# Patient Record
Sex: FEMALE | Race: WHITE | URBAN - METROPOLITAN AREA
[De-identification: names, ages, dates, MRNs, and addresses within clinical notes are randomized per-mention and may not be internally consistent; named-entity substitution may affect disease eponyms.]

---

## 2017-01-09 LAB
BLD GP AB SCN SERPL QL: NEGATIVE
HBV SURFACE AG SERPL QL IA: NONREACTIVE
HIV 1+2 AB+HIV1 P24 AG SERPL QL IA: NEGATIVE
RUBELLA ANTIBODY IGG QUANTITATIVE: NORMAL IU/ML
T PALLIDUM IGG SER QL: NONREACTIVE

## 2017-08-09 LAB
GROUP B STREP PCR: NEGATIVE
GROUP B STREP PCR: NEGATIVE

## 2017-09-06 ENCOUNTER — HOSPITAL ENCOUNTER (INPATIENT)
Facility: CLINIC | Age: 32
LOS: 3 days | Discharge: HOME HEALTH | End: 2017-09-09
Attending: ADVANCED PRACTICE MIDWIFE | Admitting: ADVANCED PRACTICE MIDWIFE
Payer: COMMERCIAL

## 2017-09-06 ENCOUNTER — ANESTHESIA (OUTPATIENT)
Dept: OBGYN | Facility: CLINIC | Age: 32
End: 2017-09-06
Payer: COMMERCIAL

## 2017-09-06 ENCOUNTER — ANESTHESIA EVENT (OUTPATIENT)
Dept: OBGYN | Facility: CLINIC | Age: 32
End: 2017-09-06
Payer: COMMERCIAL

## 2017-09-06 PROBLEM — Z34.00 PRIMIGRAVIDA, ANTEPARTUM: Status: ACTIVE | Noted: 2017-09-06

## 2017-09-06 LAB
ABO + RH BLD: NORMAL
ABO + RH BLD: NORMAL
SPECIMEN EXP DATE BLD: NORMAL

## 2017-09-06 PROCEDURE — 86901 BLOOD TYPING SEROLOGIC RH(D): CPT | Performed by: ADVANCED PRACTICE MIDWIFE

## 2017-09-06 PROCEDURE — 12000029 ZZH R&B OB INTERMEDIATE

## 2017-09-06 PROCEDURE — 25000128 H RX IP 250 OP 636: Performed by: ANESTHESIOLOGY

## 2017-09-06 PROCEDURE — 86900 BLOOD TYPING SEROLOGIC ABO: CPT | Performed by: ADVANCED PRACTICE MIDWIFE

## 2017-09-06 PROCEDURE — 25000128 H RX IP 250 OP 636: Performed by: ADVANCED PRACTICE MIDWIFE

## 2017-09-06 PROCEDURE — 37000011 ZZH ANESTHESIA WARD SERVICE

## 2017-09-06 PROCEDURE — 25000125 ZZHC RX 250: Performed by: ADVANCED PRACTICE MIDWIFE

## 2017-09-06 PROCEDURE — 25000125 ZZHC RX 250: Performed by: ANESTHESIOLOGY

## 2017-09-06 PROCEDURE — 36415 COLL VENOUS BLD VENIPUNCTURE: CPT | Performed by: ADVANCED PRACTICE MIDWIFE

## 2017-09-06 PROCEDURE — 86780 TREPONEMA PALLIDUM: CPT | Performed by: ADVANCED PRACTICE MIDWIFE

## 2017-09-06 PROCEDURE — 25000132 ZZH RX MED GY IP 250 OP 250 PS 637: Performed by: ADVANCED PRACTICE MIDWIFE

## 2017-09-06 PROCEDURE — 00HU33Z INSERTION OF INFUSION DEVICE INTO SPINAL CANAL, PERCUTANEOUS APPROACH: ICD-10-PCS | Performed by: ANESTHESIOLOGY

## 2017-09-06 PROCEDURE — 3E0R3CZ INTRODUCTION OF REGIONAL ANESTHETIC INTO SPINAL CANAL, PERCUTANEOUS APPROACH: ICD-10-PCS | Performed by: ANESTHESIOLOGY

## 2017-09-06 RX ORDER — ONDANSETRON 2 MG/ML
4 INJECTION INTRAMUSCULAR; INTRAVENOUS EVERY 6 HOURS PRN
Status: DISCONTINUED | OUTPATIENT
Start: 2017-09-06 | End: 2017-09-07

## 2017-09-06 RX ORDER — ACETAMINOPHEN 325 MG/1
650 TABLET ORAL EVERY 4 HOURS PRN
Status: DISCONTINUED | OUTPATIENT
Start: 2017-09-06 | End: 2017-09-07

## 2017-09-06 RX ORDER — EPHEDRINE SULFATE 50 MG/ML
5 INJECTION, SOLUTION INTRAMUSCULAR; INTRAVENOUS; SUBCUTANEOUS
Status: DISCONTINUED | OUTPATIENT
Start: 2017-09-06 | End: 2017-09-07

## 2017-09-06 RX ORDER — OXYTOCIN/0.9 % SODIUM CHLORIDE 30/500 ML
100-340 PLASTIC BAG, INJECTION (ML) INTRAVENOUS CONTINUOUS PRN
Status: COMPLETED | OUTPATIENT
Start: 2017-09-06 | End: 2017-09-07

## 2017-09-06 RX ORDER — ONDANSETRON 4 MG/1
4 TABLET, ORALLY DISINTEGRATING ORAL EVERY 6 HOURS PRN
Status: DISCONTINUED | OUTPATIENT
Start: 2017-09-06 | End: 2017-09-07

## 2017-09-06 RX ORDER — FENTANYL CITRATE 50 UG/ML
100 INJECTION, SOLUTION INTRAMUSCULAR; INTRAVENOUS ONCE
Status: COMPLETED | OUTPATIENT
Start: 2017-09-06 | End: 2017-09-06

## 2017-09-06 RX ORDER — SODIUM CHLORIDE, SODIUM LACTATE, POTASSIUM CHLORIDE, CALCIUM CHLORIDE 600; 310; 30; 20 MG/100ML; MG/100ML; MG/100ML; MG/100ML
INJECTION, SOLUTION INTRAVENOUS CONTINUOUS
Status: DISCONTINUED | OUTPATIENT
Start: 2017-09-06 | End: 2017-09-07

## 2017-09-06 RX ORDER — OXYCODONE AND ACETAMINOPHEN 5; 325 MG/1; MG/1
1 TABLET ORAL
Status: DISCONTINUED | OUTPATIENT
Start: 2017-09-06 | End: 2017-09-06

## 2017-09-06 RX ORDER — NALOXONE HYDROCHLORIDE 0.4 MG/ML
.1-.4 INJECTION, SOLUTION INTRAMUSCULAR; INTRAVENOUS; SUBCUTANEOUS
Status: DISCONTINUED | OUTPATIENT
Start: 2017-09-06 | End: 2017-09-07

## 2017-09-06 RX ORDER — IBUPROFEN 400 MG/1
800 TABLET, FILM COATED ORAL
Status: DISCONTINUED | OUTPATIENT
Start: 2017-09-06 | End: 2017-09-07

## 2017-09-06 RX ORDER — METHYLERGONOVINE MALEATE 0.2 MG/ML
200 INJECTION INTRAVENOUS
Status: DISCONTINUED | OUTPATIENT
Start: 2017-09-06 | End: 2017-09-07

## 2017-09-06 RX ORDER — CALCIUM CARBONATE 500 MG/1
500-1000 TABLET, CHEWABLE ORAL
Status: DISCONTINUED | OUTPATIENT
Start: 2017-09-06 | End: 2017-09-09 | Stop reason: HOSPADM

## 2017-09-06 RX ORDER — PRENATAL VIT/IRON FUM/FOLIC AC 27MG-0.8MG
1 TABLET ORAL DAILY
COMMUNITY

## 2017-09-06 RX ORDER — OXYTOCIN/0.9 % SODIUM CHLORIDE 30/500 ML
1-24 PLASTIC BAG, INJECTION (ML) INTRAVENOUS CONTINUOUS
Status: DISCONTINUED | OUTPATIENT
Start: 2017-09-06 | End: 2017-09-07

## 2017-09-06 RX ORDER — TERBUTALINE SULFATE 1 MG/ML
0.25 INJECTION, SOLUTION SUBCUTANEOUS
Status: DISCONTINUED | OUTPATIENT
Start: 2017-09-06 | End: 2017-09-07

## 2017-09-06 RX ORDER — OXYTOCIN 10 [USP'U]/ML
10 INJECTION, SOLUTION INTRAMUSCULAR; INTRAVENOUS
Status: DISCONTINUED | OUTPATIENT
Start: 2017-09-06 | End: 2017-09-07

## 2017-09-06 RX ORDER — CARBOPROST TROMETHAMINE 250 UG/ML
250 INJECTION, SOLUTION INTRAMUSCULAR
Status: DISCONTINUED | OUTPATIENT
Start: 2017-09-06 | End: 2017-09-07

## 2017-09-06 RX ORDER — NALBUPHINE HYDROCHLORIDE 10 MG/ML
2.5-5 INJECTION, SOLUTION INTRAMUSCULAR; INTRAVENOUS; SUBCUTANEOUS EVERY 6 HOURS PRN
Status: DISCONTINUED | OUTPATIENT
Start: 2017-09-06 | End: 2017-09-07

## 2017-09-06 RX ORDER — LIDOCAINE 40 MG/G
CREAM TOPICAL
Status: DISCONTINUED | OUTPATIENT
Start: 2017-09-06 | End: 2017-09-07

## 2017-09-06 RX ORDER — ROPIVACAINE HYDROCHLORIDE 2 MG/ML
10 INJECTION, SOLUTION EPIDURAL; INFILTRATION; PERINEURAL ONCE
Status: COMPLETED | OUTPATIENT
Start: 2017-09-06 | End: 2017-09-06

## 2017-09-06 RX ADMIN — SODIUM CHLORIDE, POTASSIUM CHLORIDE, SODIUM LACTATE AND CALCIUM CHLORIDE 500 ML: 600; 310; 30; 20 INJECTION, SOLUTION INTRAVENOUS at 13:10

## 2017-09-06 RX ADMIN — SODIUM CHLORIDE, POTASSIUM CHLORIDE, SODIUM LACTATE AND CALCIUM CHLORIDE: 600; 310; 30; 20 INJECTION, SOLUTION INTRAVENOUS at 20:24

## 2017-09-06 RX ADMIN — Medication 5 MG: at 17:46

## 2017-09-06 RX ADMIN — SODIUM CHLORIDE, POTASSIUM CHLORIDE, SODIUM LACTATE AND CALCIUM CHLORIDE 150 ML: 600; 310; 30; 20 INJECTION, SOLUTION INTRAVENOUS at 17:46

## 2017-09-06 RX ADMIN — SODIUM CHLORIDE, POTASSIUM CHLORIDE, SODIUM LACTATE AND CALCIUM CHLORIDE 500 ML: 600; 310; 30; 20 INJECTION, SOLUTION INTRAVENOUS at 16:22

## 2017-09-06 RX ADMIN — Medication 12 ML/HR: at 17:37

## 2017-09-06 RX ADMIN — FENTANYL CITRATE 100 MCG: 50 INJECTION, SOLUTION INTRAMUSCULAR; INTRAVENOUS at 17:16

## 2017-09-06 RX ADMIN — ACETAMINOPHEN 650 MG: 325 TABLET, FILM COATED ORAL at 20:48

## 2017-09-06 RX ADMIN — SODIUM CHLORIDE, POTASSIUM CHLORIDE, SODIUM LACTATE AND CALCIUM CHLORIDE: 600; 310; 30; 20 INJECTION, SOLUTION INTRAVENOUS at 13:45

## 2017-09-06 RX ADMIN — OXYTOCIN-SODIUM CHLORIDE 0.9% IV SOLN 30 UNIT/500ML 2 MILLI-UNITS/MIN: 30-0.9/5 SOLUTION at 14:11

## 2017-09-06 RX ADMIN — ROPIVACAINE HYDROCHLORIDE 10 ML: 2 INJECTION, SOLUTION EPIDURAL; INFILTRATION at 17:16

## 2017-09-06 NOTE — H&P
[facility] Labor and Delivery History and Physical    Renetta Araujo MRN# [id number]   Age: 31 year old YOB: 1985     Date of Admission:  2017    Primary care provider: No primary care provider on file.           Chief Complaint:   Renetta Araujo is a 31 year old female who is Unknown pregnant and being admitted for induction of labor, indication prolonged prodromal labor and AROM. She received prenatal care from [facility] and the prenatal record is scanned into the chart.          Pregnancy history:     OBSTETRIC HISTORY:    Obstetric History       T0      L0     SAB0   TAB0   Ectopic0   Multiple0   Live Births0       # Outcome Date GA Lbr Beka/2nd Weight Sex Delivery Anes PTL Lv   1 Current                   EDC: Estimated Date of Delivery: Data Unavailable    Prenatal Labs:   Lab Results   Component Value Date    ABO A 2017    RH Pos 2017       GBS Status:   Neg  Active Problem List  Patient Active Problem List   Diagnosis     Indication for care in labor or delivery     Prolonged labor antepartum     Primigravida, antepartum       Medication Prior to Admission  Prescriptions Prior to Admission   Medication Sig Dispense Refill Last Dose     RaNITidine HCl (ZANTAC PO) Take 75 mg by mouth daily   2017 at Unknown time     Prenatal Vit-Fe Fumarate-FA (PRENATAL MULTIVITAMIN PLUS IRON) 27-0.8 MG TABS per tablet Take 1 tablet by mouth daily   Past Week at Unknown time   .        Maternal Past Medical History:   History knee surgery      Family History:   I have reviewed this patient's family history            Social History:   This patient has no significant social history         Review of Systems:   The Review of Systems is negative other than noted in the HPI          Physical Exam:     Constitutional: Awake, alert, cooperative, no apparent distress, and appears stated age.  Eyes: Lids and lashes normal, pupils equal, round and reactive to light,  extra ocular muscles intact, sclera clear, conjunctiva normal.  ENT: Normocephalic, without obvious abnormality, atramatic, sinuses nontender on palpation, external ears without lesions, oral pharynx with moist mucus membranes, tonsils without erythema or exudates, gums normal and good dentition.  Neck: Supple, symmetrical, trachea midline, no adenopathy, thyroid symmetric, not enlarged and no tenderness, skin normal.  Hematologic / Lymphatic: No cervical lymphadenopathy and no supraclavicular lymphadenopathy.  Back: Symmetric, no curvature, spinous processes are non-tender on palpation, paraspinous muscles are non-tender on palpation, no costal vertebral tenderness.  Lungs: No increased work of breathing, good air exchange, clear to auscultation bilaterally, no crackles or wheezing.  Cardiovascular: Regular rate and rhythm, normal S1 and S2, no S3 or S4, and no murmur noted.  Chest / Breast: Breasts symmetrical, skin without lesion(s), no nipple retraction or dimpling, no nipple discharge, no masses palpated, no axillary or supraclavicular adenopathy.  Abdomen: No scars, normal bowel sounds, soft, non-distended, non-tender, no masses palpated, no hepatosplenomegally.  Musculoskeletal: No redness, warmth, or swelling of the joints.  Full range of motion noted.  Motor strength is 5 out of 5 all extremities bilaterally.  Tone is normal.  Neurologic: Awake, alert, oriented to name, place and time.  Cranial nerves II-XII are grossly intact.  Motor is 5 out of 5 bilaterally.  Cerebellar finger to nose, heel to shin intact.  Sensory is intact.  Babinski down going, Romberg negative, and gait is normal.  Neuropsychiatric: Normal affect, mood, orientation, memory and insight.  Skin: No rashes, erythema, pallor, petechia or purpura.   Cervix:   Membranes: intact   Dilation: 3   Effacement: 100%   Station:-1   Consistency: soft   Position: Mid  Presentation:Cephalic  Fetal Heart Rate Tracing: reactive and  reassuring  Tocometer: external monitor                       Assessment:   Renetta Araujo is a Unknown pregnant female admitted with induction of labor, augmentation and AROM.  Patient is coping poorly  Category 1 FHR pattern with reactive NST on admission.         Plan:   Admit - see IP orders  Labor induction with Pitocin  She is accompanied by  [place], who will be supporting her during her labor and birth.   She does plan to have a  working with her as well.   Her birth plan indicates the following preferences: Vitamin K for baby but no other baby meds.  Desires  screen.  Had planned for unmedicated birth and is disappointed in her long prodromal labor with little progress.  Now desires epidural for comfort and pain management.   She has not elected to have a water birth.  Desires use of nitrous gas and has been consented regarding it's use.  Routine intrapartum cares as ordered with continuous EFM.   Patient encouraged to move positions frequently to promote physiologic labor and birth.   Plan AROM to promote labor, then provide pain relief with nitrous and epidural as needed.      Emmy ANG CNM

## 2017-09-06 NOTE — ANESTHESIA PROCEDURE NOTES
Peripheral nerve/Neuraxial procedure note : epidural catheter  Pre-Procedure  Performed by BISHNU ZHANG  Location: OB      Pre-Anesthestic Checklist: patient identified, risks and benefits discussed, informed consent, pre-op evaluation and at physician/surgeon's request    Timeout  Correct Patient: Yes   Correct Procedure: Yes   Correct Site: Yes   Correct Laterality: N/A   Correct Position: Yes   Site Marked: N/A   .   Procedure Documentation    .    Procedure:    Epidural catheter.  Insertion Site:L3-4  (midline approach) Injection technique: LORT saline   Local skin infiltrated with 3 mL of 1% lidocaine.       Patient Prep;mask, sterile gloves, povidone-iodine 7.5% surgical scrub, patient draped.  .  Needle: Touhy needle Needle Gauge: 17.    Needle Length (Inches) 3.5  # of attempts: 1 and # of redirects:  .   Catheter: 19 G . .  Catheter threaded easily  3 cm epidural space.  .   .    Assessment/Narrative  Paresthesias: No.  .  .  Aspiration negative for heme or CSF  . Test dose of 3 mL lidocaine 1.5% w/ 1:200,000 epinephrine at. Test dose negative for signs of intravascular, subdural or intrathecal injection. Comments:  No blood or complications.  Secured with adhesive spray, tegaderm, and tape.

## 2017-09-06 NOTE — PLAN OF CARE
"1555: pt requesting pain med.  SVE by RN.    1600:  Pt wants to try nitrous.  Pt took one breath of nitrous through FM, said \"don't like it, shut it off!\".  IV bolus not running with pt on hands and knees.  Pt unable to keep hand straight for IV to flow.  IV on pump.  1610:  CNM updated on SVE and epidural request.  "

## 2017-09-06 NOTE — IP AVS SNAPSHOT
MRN:[id number]                      After Visit Summary   9/6/2017    Renetta Araujo    MRN: [id number]           Thank you!     Thank you for choosing [facility] for your care. Our goal is always to provide you with excellent care. Hearing back from our patients is one way we can continue to improve our services. Please take a few minutes to complete the written survey that you may receive in the mail after you visit with us. Thank you!        Patient Information     Date Of Birth          1985        About your hospital stay     You were admitted on:  September 6, 2017 You last received care in the:  [address]    You were discharged on:  September 9, 2017        Reason for your hospital stay       Birth of baby boy                  Who to Call     For medical emergencies, please call 911.  For non-urgent questions about your medical care, please call your primary care provider or clinic, None          Attending Provider     Provider Specialty    Emmy Purcell APRN CN[facility] Midwives    Rubia Hale APRN CNM OB/Gyn       Primary Care Provider    None      After Care Instructions     Diet       Follow this diet upon discharge: your normal healthy diet                  Follow-up Appointments     Follow-up and recommended labs and tests        Follow up with me,  Emmy Purcell, within 8 weeks  . for hospital follow- up.  No follow up labs or test are needed.                  Further instructions from your care team       Postpartum Vaginal Delivery Instructions    Activity       Ask family and friends for help when you need it.    Do not place anything in your vagina for 6 weeks.    You are not restricted on other activities, but take it easy for a few weeks to allow your body to recover from delivery.  You are able to do any activities you feel up to that point.    No driving until you have stopped taking your pain medications (usually two weeks  after delivery).     Call your health care provider if you have any of these symptoms:       Increased pain, swelling, redness, or fluid around your stiches from an episiotomy or perineal tear.    A fever above 100.4 F (38 C) with or without chills when placing a thermometer under your tongue.    You soak a sanitary pad with blood within 1 hour, or you see blood clots larger than a golf ball.    Bleeding that lasts more than 6 weeks.    Vaginal discharge that smells bad.    Severe pain, cramping or tenderness in your lower belly area.    A need to urinate more frequently (use the toilet more often), more urgently (use the toilet very quickly), or it burns when you urinate.    Nausea and vomiting.    Redness, swelling or pain around a vein in your leg.    Problems breastfeeding or a red or painful area on your breast.    Chest pain and cough or are gasping for air.    Problems coping with sadness, anxiety, or depression.  If you have any concerns about hurting yourself or the baby, call your provider immediately.     You have questions or concerns after you return home.     Keep your hands clean:  Always wash your hands before touching your perineal area and stitches.  This helps reduce your risk of infection.  If your hands aren't dirty, you may use an alcohol hand-rub to clean your hands. Keep your nails clean and short.        Pending Results     No orders found from 9/4/2017 to 9/7/2017.            Statement of Approval     Ordered          09/08/17 2027  I have reviewed and agree with all the recommendations and orders detailed in this document.  EFFECTIVE NOW     Comments:  Discharge patient early am on 9/9/17   Approved and electronically signed by:  Emmy Purcell APRN CNM             Admission Information     Date & Time Provider Department Dept. Phone    9/6/2017 Rubia Hale APRN CNM [address] [phone]      Your Vitals Were     Blood Pressure Pulse  "Temperature Respirations Height Weight    118/63 59 98.1  F (36.7  C) (Oral) 16 1.575 m (5' 2\") 85.7 kg (189 lb)    Pulse Oximetry BMI (Body Mass Index)                95% 34.57 kg/m2          [employer] Information     [employer] lets you send messages to your doctor, view your test results, renew your prescriptions, schedule appointments and more. To sign up, go to [email or web address] . Click on \"Log in\" on the left side of the screen, which will take you to the Welcome page. Then click on \"Sign up Now\" on the right side of the page.     You will be asked to enter the access code listed below, as well as some personal information. Please follow the directions to create your username and password.     Your access code is: [email or web address]  Expires: 2017  8:26 PM     Your access code will  in 90 days. If you need help or a new code, please call your [facility] clinic or [phone].        Care EveryWhere ID     This is your Care EveryWhere ID. This could be used by other organizations to access your [facility] medical records  [id number]        Equal Access to Services     ECTOR SALEEM AH: Kemar Trammell, wazenon martinez, qaybta kaalmada adeglenroyyada, jessica tavera. So [facility] [phone].    ATENCIÓN: Si habla español, tiene a sarabia disposición servicios gratuitos de asistencia lingüística. Llame al [phone].    We comply with applicable federal civil rights laws and [place] laws. We do not discriminate on the basis of race, color, national origin, age, disability sex, sexual orientation or gender identity.               Review of your medicines      CONTINUE these medicines which have NOT CHANGED        Dose / Directions    prenatal multivitamin plus iron 27-0.8 MG Tabs per tablet        Dose:  1 tablet   Take 1 tablet by mouth daily   Refills:  0         STOP taking     ZANTAC PO                    Protect others around you: Learn how to safely use, store and throw away " your medicines at www.disposemymeds.org.             Medication List: This is a list of all your medications and when to take them. Check marks below indicate your daily home schedule. Keep this list as a reference.      Medications           Morning Afternoon Evening Bedtime As Needed    prenatal multivitamin plus iron 27-0.8 MG Tabs per tablet   Take 1 tablet by mouth daily

## 2017-09-06 NOTE — IP AVS SNAPSHOT
[address]    [place] [address]    Phone:  [phone]                                       After Visit Summary   9/6/2017    Renetta Araujo    MRN: [id number]           After Visit Summary Signature Page     I have received my discharge instructions, and my questions have been answered. I have discussed any challenges I see with this plan with the nurse or doctor.    ..........................................................................................................................................  Patient/Patient Representative Signature      ..........................................................................................................................................  Patient Representative Print Name and Relationship to Patient    ..................................................               ................................................  Date                                            Time    ..........................................................................................................................................  Reviewed by Signature/Title    ...................................................              ..............................................  Date                                                            Time

## 2017-09-06 NOTE — PLAN OF CARE
Dr. Feliciano at bedside.  Pt assisted into position.  Time out done.  Ropivacaine 0.2% vial and Fentanyl 100 mcg IV handed off to [facility] for epidural use.

## 2017-09-06 NOTE — PLAN OF CARE
Problem: Labor (Cervical Ripen, Induct, Augment) (Adult,Obstetrics,Pediatric)  Goal: Signs and Symptoms of Listed Potential Problems Will be Absent or Manageable (Labor)  Signs and symptoms of listed potential problems will be absent or manageable by discharge/transition of care (reference Labor (Cervical Ripen, Induct, Augment) (Adult,Obstetrics,Pediatric) CPG).   Outcome: No Change  Patient here for augmentation to her prodromal labor; AROM performed by Marquise Booth CNM discussed with patient, patient agrees with plan. Patient desires epidural, will proceed when in active labor. Category I tracing. VSS.

## 2017-09-07 LAB — T PALLIDUM IGG+IGM SER QL: NEGATIVE

## 2017-09-07 PROCEDURE — 25000132 ZZH RX MED GY IP 250 OP 250 PS 637: Performed by: ADVANCED PRACTICE MIDWIFE

## 2017-09-07 PROCEDURE — 12000037 ZZH R&B POSTPARTUM INTERMEDIATE

## 2017-09-07 PROCEDURE — 0KQM0ZZ REPAIR PERINEUM MUSCLE, OPEN APPROACH: ICD-10-PCS | Performed by: ADVANCED PRACTICE MIDWIFE

## 2017-09-07 PROCEDURE — 72200001 ZZH LABOR CARE VAGINAL DELIVERY SINGLE

## 2017-09-07 PROCEDURE — 25000125 ZZHC RX 250: Performed by: ADVANCED PRACTICE MIDWIFE

## 2017-09-07 PROCEDURE — 25000128 H RX IP 250 OP 636: Performed by: ADVANCED PRACTICE MIDWIFE

## 2017-09-07 RX ORDER — OXYTOCIN/0.9 % SODIUM CHLORIDE 30/500 ML
100 PLASTIC BAG, INJECTION (ML) INTRAVENOUS CONTINUOUS
Status: DISCONTINUED | OUTPATIENT
Start: 2017-09-07 | End: 2017-09-08

## 2017-09-07 RX ORDER — MISOPROSTOL 200 UG/1
400 TABLET ORAL
Status: DISCONTINUED | OUTPATIENT
Start: 2017-09-07 | End: 2017-09-09 | Stop reason: HOSPADM

## 2017-09-07 RX ORDER — IBUPROFEN 400 MG/1
400-800 TABLET, FILM COATED ORAL EVERY 6 HOURS PRN
Status: DISCONTINUED | OUTPATIENT
Start: 2017-09-07 | End: 2017-09-09 | Stop reason: HOSPADM

## 2017-09-07 RX ORDER — DEXTROSE, SODIUM CHLORIDE, SODIUM LACTATE, POTASSIUM CHLORIDE, AND CALCIUM CHLORIDE 5; .6; .31; .03; .02 G/100ML; G/100ML; G/100ML; G/100ML; G/100ML
INJECTION, SOLUTION INTRAVENOUS CONTINUOUS
Status: DISCONTINUED | OUTPATIENT
Start: 2017-09-07 | End: 2017-09-09 | Stop reason: HOSPADM

## 2017-09-07 RX ORDER — OXYTOCIN/0.9 % SODIUM CHLORIDE 30/500 ML
340 PLASTIC BAG, INJECTION (ML) INTRAVENOUS CONTINUOUS PRN
Status: DISCONTINUED | OUTPATIENT
Start: 2017-09-07 | End: 2017-09-09 | Stop reason: HOSPADM

## 2017-09-07 RX ORDER — IBUPROFEN 400 MG/1
400-800 TABLET, FILM COATED ORAL EVERY 6 HOURS PRN
Status: DISCONTINUED | OUTPATIENT
Start: 2017-09-07 | End: 2017-09-07

## 2017-09-07 RX ORDER — OXYTOCIN/0.9 % SODIUM CHLORIDE 30/500 ML
100 PLASTIC BAG, INJECTION (ML) INTRAVENOUS CONTINUOUS
Status: DISCONTINUED | OUTPATIENT
Start: 2017-09-07 | End: 2017-09-07

## 2017-09-07 RX ORDER — LANOLIN 100 %
OINTMENT (GRAM) TOPICAL
Status: DISCONTINUED | OUTPATIENT
Start: 2017-09-07 | End: 2017-09-09 | Stop reason: HOSPADM

## 2017-09-07 RX ORDER — MISOPROSTOL 200 UG/1
400 TABLET ORAL
Status: DISCONTINUED | OUTPATIENT
Start: 2017-09-07 | End: 2017-09-07

## 2017-09-07 RX ORDER — HYDROCORTISONE 2.5 %
CREAM (GRAM) TOPICAL 3 TIMES DAILY PRN
Status: DISCONTINUED | OUTPATIENT
Start: 2017-09-07 | End: 2017-09-07

## 2017-09-07 RX ORDER — NALOXONE HYDROCHLORIDE 0.4 MG/ML
.1-.4 INJECTION, SOLUTION INTRAMUSCULAR; INTRAVENOUS; SUBCUTANEOUS
Status: DISCONTINUED | OUTPATIENT
Start: 2017-09-07 | End: 2017-09-07

## 2017-09-07 RX ORDER — BISACODYL 10 MG
10 SUPPOSITORY, RECTAL RECTAL DAILY PRN
Status: DISCONTINUED | OUTPATIENT
Start: 2017-09-09 | End: 2017-09-07

## 2017-09-07 RX ORDER — OXYTOCIN 10 [USP'U]/ML
10 INJECTION, SOLUTION INTRAMUSCULAR; INTRAVENOUS
Status: DISCONTINUED | OUTPATIENT
Start: 2017-09-07 | End: 2017-09-09 | Stop reason: HOSPADM

## 2017-09-07 RX ORDER — AMOXICILLIN 250 MG
1-2 CAPSULE ORAL 2 TIMES DAILY
Status: DISCONTINUED | OUTPATIENT
Start: 2017-09-07 | End: 2017-09-07

## 2017-09-07 RX ORDER — HYDROCORTISONE 2.5 %
CREAM (GRAM) TOPICAL 3 TIMES DAILY PRN
Status: DISCONTINUED | OUTPATIENT
Start: 2017-09-07 | End: 2017-09-09 | Stop reason: HOSPADM

## 2017-09-07 RX ORDER — BISACODYL 10 MG
10 SUPPOSITORY, RECTAL RECTAL DAILY PRN
Status: DISCONTINUED | OUTPATIENT
Start: 2017-09-09 | End: 2017-09-09 | Stop reason: HOSPADM

## 2017-09-07 RX ORDER — OXYTOCIN/0.9 % SODIUM CHLORIDE 30/500 ML
340 PLASTIC BAG, INJECTION (ML) INTRAVENOUS CONTINUOUS PRN
Status: DISCONTINUED | OUTPATIENT
Start: 2017-09-07 | End: 2017-09-07

## 2017-09-07 RX ORDER — AMOXICILLIN 250 MG
1-2 CAPSULE ORAL 2 TIMES DAILY
Status: DISCONTINUED | OUTPATIENT
Start: 2017-09-07 | End: 2017-09-09 | Stop reason: HOSPADM

## 2017-09-07 RX ORDER — NALOXONE HYDROCHLORIDE 0.4 MG/ML
.1-.4 INJECTION, SOLUTION INTRAMUSCULAR; INTRAVENOUS; SUBCUTANEOUS
Status: DISCONTINUED | OUTPATIENT
Start: 2017-09-07 | End: 2017-09-09 | Stop reason: HOSPADM

## 2017-09-07 RX ORDER — LANOLIN 100 %
OINTMENT (GRAM) TOPICAL
Status: DISCONTINUED | OUTPATIENT
Start: 2017-09-07 | End: 2017-09-07

## 2017-09-07 RX ORDER — OXYTOCIN 10 [USP'U]/ML
10 INJECTION, SOLUTION INTRAMUSCULAR; INTRAVENOUS
Status: DISCONTINUED | OUTPATIENT
Start: 2017-09-07 | End: 2017-09-07

## 2017-09-07 RX ORDER — ACETAMINOPHEN 325 MG/1
650 TABLET ORAL EVERY 4 HOURS PRN
Status: DISCONTINUED | OUTPATIENT
Start: 2017-09-07 | End: 2017-09-07

## 2017-09-07 RX ORDER — ACETAMINOPHEN 325 MG/1
650 TABLET ORAL EVERY 4 HOURS PRN
Status: DISCONTINUED | OUTPATIENT
Start: 2017-09-07 | End: 2017-09-09 | Stop reason: HOSPADM

## 2017-09-07 RX ADMIN — RANITIDINE 150 MG: 150 TABLET ORAL at 03:06

## 2017-09-07 RX ADMIN — ACETAMINOPHEN 650 MG: 325 TABLET, FILM COATED ORAL at 08:28

## 2017-09-07 RX ADMIN — RANITIDINE 150 MG: 150 TABLET ORAL at 20:26

## 2017-09-07 RX ADMIN — CALCIUM CARBONATE (ANTACID) CHEW TAB 500 MG 1000 MG: 500 CHEW TAB at 00:19

## 2017-09-07 RX ADMIN — OXYTOCIN-SODIUM CHLORIDE 0.9% IV SOLN 30 UNIT/500ML 2 MILLI-UNITS/MIN: 30-0.9/5 SOLUTION at 01:47

## 2017-09-07 RX ADMIN — SODIUM CHLORIDE, POTASSIUM CHLORIDE, SODIUM LACTATE AND CALCIUM CHLORIDE: 600; 310; 30; 20 INJECTION, SOLUTION INTRAVENOUS at 03:38

## 2017-09-07 RX ADMIN — IBUPROFEN 800 MG: 400 TABLET ORAL at 08:28

## 2017-09-07 RX ADMIN — SENNOSIDES AND DOCUSATE SODIUM 1 TABLET: 8.6; 5 TABLET ORAL at 20:26

## 2017-09-07 RX ADMIN — ACETAMINOPHEN 650 MG: 325 TABLET, FILM COATED ORAL at 14:45

## 2017-09-07 RX ADMIN — ACETAMINOPHEN 650 MG: 325 TABLET, FILM COATED ORAL at 02:28

## 2017-09-07 RX ADMIN — ACETAMINOPHEN 650 MG: 325 TABLET, FILM COATED ORAL at 18:51

## 2017-09-07 RX ADMIN — IBUPROFEN 800 MG: 400 TABLET ORAL at 20:26

## 2017-09-07 RX ADMIN — IBUPROFEN 800 MG: 400 TABLET ORAL at 14:45

## 2017-09-07 RX ADMIN — SODIUM CHLORIDE, SODIUM LACTATE, POTASSIUM CHLORIDE, CALCIUM CHLORIDE AND DEXTROSE MONOHYDRATE: 5; 600; 310; 30; 20 INJECTION, SOLUTION INTRAVENOUS at 00:50

## 2017-09-07 RX ADMIN — OXYTOCIN-SODIUM CHLORIDE 0.9% IV SOLN 30 UNIT/500ML 340 ML/HR: 30-0.9/5 SOLUTION at 06:14

## 2017-09-07 NOTE — CONSULTS
"DATE OF SERVICE:  2017      Renetta Araujo is a 31-year-old  1, now para 1 with intrauterine pregnancy at 40 weeks and 2 days who after several days of prodromal labor was transferred to [facility] by the [place] midwife team.  The patient upon arrival was noted to be 3 cm, 100% effaced.  Clear fluid was noted.  She received an epidural anesthetic and was placed on Pitocin.  At this point in time, the patient did have occasional variable decelerations; however, there was good variability and occasional accelerations.  The patient became completely dilated.  She began pushing, however, with each push the baby had prolonged variable decelerations with a late return.  The patient pushed for 3 times and then stopped as ordered by the midwife present.  I was called to assess the situation for possible vacuum-assisted delivery. Review of the fetal heart tracing indeed revealed severe prolong variable decelerations with late return when she was pushing.   At the time the baby was noted to be at a +1 station, ROT, estimated fetal weight being 7 pounds.  The patient was offered a trial of vacuum versus a  section.  The patient strongly declined  section.  She was very hesitant to proceed with attempt at vacuum delivery.  After further discussion and re review of the fetal tracing it was agreed upon that the patient would push with every other contraction allowing the baby to \"rest\" in between.   She did this for approximately 2 hours, at which time the baby's head was brought down to a +3 station.  The decelerations were now  prolonged and deep lasting several minutes with late return.  At this point in time, the patient agreed to proceed with a vacuum delivery.  The risks were reviewed: bruising to fetal scalp, injury to the fetal cranial bones, hemorrhage and injury to the fetal brain, possible injury to maternal tissue.  After cleansing the area with Betadine solution the " bladder was emptied.  30cc of clear urine was obtained.   The Kiwi vaccum was placed on the occiput of the baby's head.  The baby was noted to be in the ROT position.  With the first pull, the patient's right labia became entrapped under the Kiwi therefore, the Kiwi was removed.  With the next contraction, the Kiwi was placed and with one single pull, the head was delivered to a +5 station.  At this point in time, the Kiwi was removed, and the midwife present stepped in and completed delivery of the fetal head and body.  Please see her dictation.         DAWSON SINGLETON MD             D: 2017 06:29   T: 2017 07:14   MT: ANTOINETTE      Name:     DEVENDRA CARLIN   MRN:      [id number]        Account:       [id number]   :      1985           Consult Date:  2017      Document: [id number]

## 2017-09-07 NOTE — PLAN OF CARE
0930 patient assisted up to bathroom with stand by assist of one , able to void 400cc harrison urine. Pericare instructions given and patient verbalized understanding. Settled back into bed to rest.

## 2017-09-07 NOTE — PROGRESS NOTES
Called to perform assisted vacuum delivery for continued deep prolong variable decelerations. Exam= +3 station /ROT/ EFW=7lb    Risks of procedure discussed with patient and family: bruising of scalp, injury to fetal cranial bones, bleeding in the brain and injury of maternal tissues. Patient accepts the risks.    Bladder emptied= 30cc of clear urine  Kiwi placed on the occiput. First pull was stopped due to labia trapped underneath. The vacuum was removed and readjusted. Fetal head delivered to a +5 station with 2nd pull. Midwife then completed the delivery of the fetal head and body.  See dictation for full report.

## 2017-09-07 NOTE — PLAN OF CARE
Data: Renetta Araujo transferred to 4413 via wheelchair at 1430. Baby transferred via parent's arms.  Action: Receiving unit notified of transfer: Yes. Patient and family notified of room change. Report given to Francheska NOGUEIRA RN at 1430. Belongings sent to receiving unit. Accompanied by Registered Nurse. Oriented patient to surroundings. Call light within reach. ID bands double-checked with receiving RN.  Response: Patient tolerated transfer and is stable.

## 2017-09-07 NOTE — PROGRESS NOTES
S: Coping with labor utilizing the following methods: epidural in place and patient is comfortable  O: Temp:  [97.7  F (36.5  C)-99.3  F (37.4  C)] 99.3  F (37.4  C)  BP: ()/(50-79) 98/56  SpO2:  [92 %-100 %] 96 % Contx: rare; Fetus: FHR variability is moderate with periods of exaggerated variability; Dilation 6/100-/-1  A: Labor progressing, patient and fetus stable, Category 1 tracing.  Periods of exaggerated variability resolved with position changes and O2 admin.  Pitocin was dc'd to resolve heart tone concerns.  P: Anticipate birth, continue current cares to promote physiologic birth.  IUPC placed.  Restart pitocin to adequate labor.  Jean placed.  Peanut ball positioning.  Patient encouraged to rest.

## 2017-09-07 NOTE — ANESTHESIA POSTPROCEDURE EVALUATION
Patient: Renetta Araujo    * No procedures listed *    Diagnosis:* No pre-op diagnosis entered *  Diagnosis Additional Information: No value filed.    Anesthesia Type:  No value filed.    Note:  Anesthesia Post Evaluation    Patient location during evaluation: Bedside  Patient participation: Able to fully participate in evaluation     Anesthetic complications: None    Comments: The patient was satisfied with her labor epidural and had no complaints. She is able to ambulate and denies urinary or bowel complaints.         Last vitals:  Vitals:    09/07/17 1215 09/07/17 1445 09/07/17 1515   BP: 95/50  107/68   Resp:  16 16   Temp: 36.7  C (98.1  F)  36.7  C (98  F)   SpO2: 95%           Electronically Signed By: Daniel Miramontes MD  September 7, 2017  6:20 PM

## 2017-09-07 NOTE — L&D DELIVERY NOTE
OB Vaginal Birth Note    Renetta Araujo MRN# [id number]   Age: 31 year old YOB: 1985       GA: 40w2d  GP:   Labor Complications: Dysfunctional Labor;Fetal Intolerance;Prolonged PROM (>18 hours)   EBL: 350  mL  QBL:    Delivery Type: Vaginal, Vacuum (Extractor)   ROM to Delivery Time: 16h 26m   Weight:      1 Minute 5 Minute 10 Minute   Apgar Totals: 8    9                Delivery Details:  Renetta Araujo, a 31 year old  female delivered a viable infant with apgars of 8   and 9  . Patient was fully dilated and pushing after    hours    minutes in active labor. Delivery was via vaginal, vacuum (extractor)  to a sterile field under epidural;nitrous oxide  anesthesia. Infant delivered in vertex  middle  occiput  anterior  position. Anterior and posterior shoulders delivered without difficulty. The cord was clamped, cut twice and 3 vessels  were noted. Cord blood was obtained in routine fashion with the following disposition: lab .      Cord complications: none   Placenta delivered at   6:18 AM . Placental disposition was   . Fundal massage performed and fundus found to be firm.     Episiotomy: none    Perineum, vagina, cervix were inspected, and the following lacerations were noted:   Perineal lacerations: 1st   periurethral laceration: bilateral                 Laceration repaired by colleague: Rubia Hale CNM    Excellent hemostasis was noted. Needle count correct. Infant and patient in delivery room in good and stable condition.         Labor Event Times    Labor onset date:  17 Onset time:   3:55 PM            Labor Length    3rd Stage (hrs):  0 (min):  7      Labor Events     labor?:  No    steroids:  None   Labor Type:  Augmentation   Predominate monitoring during 1st stage:  continuous electronic fetal monitoring      Antibiotics received during labor?:  No      Rupture date/time: 17 1345   Rupture type:  Artificial Rupture of Membranes   Fluid odor:   Normal      Augmentation:  Oxytocin, AROM   Indications for augmentation:  Ineffective Contraction Pattern   1:1 continuous labor support provided by?:  herve DEE          Delivery/Placenta Date and Time    Delivery Date:  9/7/17 Delivery Time:   6:11 AM   Placenta Date/Time:  9/7/2017  6:18 AM   Oxytocin given at the time of delivery:  after delivery of baby      Vaginal Counts    Initial count performed by 2 team members:   Two Team Members   HAYLEE Newman RN          Needles Suture [place] [employer]ong[employer]   Initial counts 2  5    Added to count  1     Final counts 2 1 5       Placed during labor Accounted for at the end of labor   Yes Yes   Yes Yes   No No      Final count performed by 2 team members:   Two Team Members   HAYLEE Newman RN         Final count correct?:  Yes         Apgars     1 Minute 5 Minute 10 Minute 15 Minute 20 Minute   Skin color: 0  1       Heart rate: 2  2       Reflex irritability: 2  2       Muscle tone: 2  2       Respiratory effort: 2  2       Total: 8  9          Apgars assigned by:  VANESSA MEDINA RN      Cord    Vessels:  3 Vessels Complications:  None   Cord Blood Disposition:  Lab Gases Sent?:  No         Skin to Skin and Feeding Plan    Skin to skin initiation date/time: 9/7/17 0615   Skin to skin with:  Mother   Skin to skin end date/time:     How do you plan to feed your baby:  Breastfeeding      Labor Events and Shoulder Dystocia    Fetal Tracing Prior to Delivery:  Category 2   Fetal Tracing Comments:  variability remained reassuring, but multiple prolonged decels during 2nd stage pushing    Shoulder dystocia present?:  Neg            Delivery (Maternal) (Provider to Complete) (860650)    Episiotomy:  None   Perineal lacerations:  1st    Periurethral laceration:  bilateral    Vaginal laceration?:  No    Cervical laceration?:  No    Est. blood loss (mL):  350   Number of repair packets:  1         Mother's Information  Mother:  GayleRenetta #[id number]    Start of Mother's Information     IO Blood Loss  17 1555 - 17 0634    Mom's I/O Activity            End of Mother's Information  Mother: Renetta Araujo #[id number]            Delivery - Provider to Complete (620613)    Delivering clinician:  EMMY AG   CNDANNA Care:  Any CNM care in labor   Attempted Delivery Types (Choose all that apply):  Vacuum (Extractor)   Delivery Type (Choose the 1 that will go to the Birth History):  Vaginal, Vacuum (Extractor)   Verbal Informed Consent Obtained For Vacuum:  Yes Alternate Labor Strategies Discussed (vacuum):  Yes      Type (vacuum):  Kiwi       Position (vacuum):  OA Fetal Station (vacuum):  +3      Indication for Operative Vaginal Delivery (vacuum):  Fetal Status   Operative Vaginal Delivery Brief Note Vacuum:  Per Dr. Magallanes to :                  Placenta    Delayed Cord Clamping:  Done   Date/Time:  2017  6:18 AM   Removal:  Spontaneous   Comments:  mec stained      Anesthesia    Method:  Epidural, Nitrous Oxide   Cervical dilation at placement:  4-7         Presentation and Position    Presentation:  Vertex   Position:  Middle Occiput Anterior                    Emmy ANG CNM

## 2017-09-07 NOTE — PLAN OF CARE
Problem: Goal Outcome Summary  Goal: Goal Outcome Summary  Outcome: No Change  1445 Pt. admitted from L&D  via W/C and transferred to bed with stand-by assist. Pt. arrived with baby and was accompanied by  and arrived with personal belongings. Report was taken from Ange in L&D. VSS. Fundus is firm and midline.  Vaginal bleeding is scant.  SL in lt hand.  Pt. oriented to the room and call light system.

## 2017-09-07 NOTE — PROCEDURES
Post Vaginal Birth Repair    After birth of infant and delivery of placenta, primary care of patient was resumed. Active management of third stage with IV pitocin infusing. Fundus firm. The vagina and perineum was thoroughly inspected. A second degree perineal laceration, right labial and left labial with extension to vaginal wall lacerations were noted. All repaired with adequate anesthesia via epidural uding 3-0 vicryl in standard fashion for hemostasis.     Sponge and sharp counts correct.     Plan for routine postpartum care.     SAV Seals, CNM, WHKARI-BC

## 2017-09-07 NOTE — LACTATION NOTE
Initial visit.   Breastfeeding handout given.   Advised to breastfeed exclusively, on demand, avoid pacifiers, bottles and formula unless medically indicated.  Encouraged rooming in, skin to skin, feeding on demand 8-12x/day or sooner if baby cues.  Explained benefits of holding and skin to skin.  Encouraged lots of skin to skin.Instructed on hand expression. Patient reports she is working with midwives an has a resource for lactation follow up after discharge. No further questions at this time.    Continues to nurse well per mom.   Will follow as needed.   Jesusita Duggan RNC, IBCLC

## 2017-09-07 NOTE — PLAN OF CARE
Assumed care of this pt at 2330 from Yanet LANDON RN. Emmy HAYLEE Purcell was provider responsible for this pt.     0133: Rubia Hale CNM called to say she would be assuming care of this pt for Emmy. She was on her way over from [place]. RN told CNM pt was complete and laborind down and at this time pt was experiencing variables prolonged at times and pitocin had been turned off. RN will start pitocin again at 0145.    0202: Rubia Hale CNM at bedside to assess FHT and ctx pattern. Pt was still having prolonged variables and lates and the decision was made to increase pitocin to 4m/u. And to start pushing in about 30 min.    0240: Pt was coached how to push by HAYLEE and RN removed hendrix catheter. Pt began pushing. After first 2 contractions, CNM appeared concerned about the dip in FHT down to 60's lasting  2 minutes a piece. Rubia Hale CNM, asked RN who her back-up OB was. RN left the room to gather that information. Jeanette Mcdermott, charge RN found that Dr Magallanes was the midwife back-up OB overnight. RN returned to room 212 with Dr Magallanes's contact information. Upon returning to 212, RN asked CNM if she would like to call Dr Magallanes. CNM asked RN to call Dr Magallanes to come and evaluate this patient for possible vacuum assisted delivery. This RN was not present for conversation between CNM and her pt about why the OB back-up was called.    0248: Upon request of Rubia LEACH, this RN called Dr Magallanes and asked to come as midwife back-up for a possible vacuum assisted delivery. Dr Magallanes agreed but said it would take her 25-30 min to reach [facility] and advised to use the in-house if the need presented itself.    Pushing efforts ceased until  Dr Magallanes arrived.    0311: Dr Magallanes was at bedside. Dr Magallanes evaluated pushing efforts and FHT. Upon observation, Dr Magallanes, talked to patient about her baby's low heart rate and the length that the heart rate was low and why that presents a cause for concern. She then talked to patient about a vacuum  "assisted vaginal delivery and even needing to consider a . Pt was immediately against  and didn't like the the sound of using a vacuum either. Dr Magallanes said to the patient, \" you DO realize why I was called in don't you?\" Pt remained addiment that she did not want a . While Dr Magallanes was in the room the pt continued to showcase deep variables. After position changes were completed and and FHT resumed safe baseline. Dr Magallanes said she would watch strip out at the desk and suggested only pushing with every other contraction.    0345: Rubia Hale CNM, resumed pushing with pt during every other contraction. Pt's FHT's  Continued to display deep variables and prolonged varialbes.    0430: Rubia Hale CNM was called to another delivery on the unit and Emmy Purcell CNM, resumed care of pt at this time. Dr Magallanes was also still present on the unit.    0431:  Unable to regain FHT baseline; pt was positioned on her hands and knees and FHT returned to baseline. After 5 min on hands and knees pt resumed side lying position; pushing with every other ctx.    0528: Pt continues to push every other contraction; FHT tracing displays proglonged decels during pushing and decels with nonpushing contractions.    0532: IV fluid bolus given for low HT''s.    0550: Emmy Purcell CNM, discussed the danger of continuing to push with these low HT's and again asked pt if she would be open to Dr Magallanes evaluating again for a vacuum assisted delivery. Pt agreed.    0600: Dr Magallanes at bedside and evaluated pt pushing infant at +3 and consents pt and her  to a vacuum assist with her next push.    0604: Charge RN called to 2nd for delivery. NNP called to attend delivery    0607: Care Team assembled in room 212 and Dr Magallanes proceeds with kiwi vacuum during ctx at 0609. 30 second pull with no pop-offs and head was delivered. At this time Dr Magallanes stepped aside and Emmy Purcell CNM completed delivery.    0611: Vaginal " delivery of viable male infant. Apgars 8 & 9. Delayed cord clamping and then infant was taken to warmer for a quick evaluation and then placed on mom's chest for skin-to-skin.

## 2017-09-07 NOTE — PLAN OF CARE
Problem: Postpartum, Vaginal Delivery (Adult)  Goal: Signs and Symptoms of Listed Potential Problems Will be Absent or Manageable (Postpartum, Vaginal Delivery)  Signs and symptoms of listed potential problems will be absent or manageable by discharge/transition of care (reference Postpartum, Vaginal Delivery (Adult) CPG).   Outcome: Improving  Pain well controlled, using ice packs for sore perineum, breast feeding well, declined asst.  Up independently in room, voiding adequately.  Requesting to discharge to home tomorrow.

## 2017-09-07 NOTE — PLAN OF CARE
Problem: Labor (Cervical Ripen, Induct, Augment) (Adult,Obstetrics,Pediatric)  Goal: Signs and Symptoms of Listed Potential Problems Will be Absent or Manageable (Labor)  Signs and symptoms of listed potential problems will be absent or manageable by discharge/transition of care (reference Labor (Cervical Ripen, Induct, Augment) (Adult,Obstetrics,Pediatric) CPG).   Outcome: Therapy, progress toward functional goals as expected  Pt received epidural for pain control and is happy with results --able to rest.  FHT's with periodic issues with decels during course of labor --PD after epidural which responded to IVF bolus/oxygen therapy/repositioning/shutting off pitocin.  Later, again had repetitive late decels with marked variability during decel after pt was repositioned to her right side.  FHT's improved with IVF bolus/oxygen therapy/shutting off pitocin (only at 4 mu)/and repositioning pt back to left lateral with peanut ball.  CNM came in to evaluate strip and pt.  IUPC placed.  FHT's stable currently with pt on her right side with peanut ball.  Will retry to start pitocin at 2mu.

## 2017-09-07 NOTE — PROGRESS NOTES
Assumed care of patient from Emmy Purcell CNM at 0200 on 9/7/17.    LABOR PROGRESS NOTE:    S: Coping with labor utilizing the following methods: epidural. Side-lying in bed with  and  at bedside. Laboring down since approximately 0045. Pitocin was resumed about 20 minutes ago. Renetta states she is feeling more pressure with contractions.   O: Temp:  [97.7  F (36.5  C)-99.3  F (37.4  C)] 98.4  F (36.9  C)  Resp:  [16] 16  BP: ()/(50-79) 114/61  SpO2:  [92 %-100 %] 100 % Contx: spaced with pitocin off, q4-5 min.  SVE: not done, complete per RN previous exam  Fetus: baseline 140s, moderate to minimal variability, accels presents, early and variable decels noted  A: Second stage labor, currently laboring down with epidural  Category II tracing  P: Plan to attempt maternal pushing efforts as fetus tolerates in 20-30 minutes.    Pitocin augmentation of labor as fetus tolerates.   Anticipate vaginal birth.     SAV Seals, HAYLEE, WHNP-BC

## 2017-09-07 NOTE — PLAN OF CARE
0245: HAYLEE Hale requested for back-up OB to be called for delivery. FHT showing prolonged decels.    0248: Dr. Magallanes called and she will head out, she is 22-30 min out.

## 2017-09-07 NOTE — PROGRESS NOTES
OB     Called in by midwife for possible vacuum assisted delivery due to fetal concerns. Patient has labored down for 1 hour. Has pushed 3 times at the time of evaluation. Fetus having deep variable decelerations with late component with each contraction. Less when not pushing. Vertex/ OT/ +1/2 station/ +1 meconium/ EFW= 7.5lbs.There is mild to moderate variability. Patient was offered  section but strongly declines.    Pitocin has been reduced/ maternal O2/ IVF bolus/materal position change have all been done.    After further discussion will allow patient to continue to push but will push with every other contraction to allow baby to recover.     Assessment  Early into the 2nd stage of labor                        Deep variable decelerations with late component with each contraction but less                        when not pushing. There is mild to moderate variability.    Plan  For now will allow patient to push but with only every other contraction. She is making good effort with pushing.

## 2017-09-08 PROCEDURE — 25000132 ZZH RX MED GY IP 250 OP 250 PS 637: Performed by: ADVANCED PRACTICE MIDWIFE

## 2017-09-08 PROCEDURE — 12000037 ZZH R&B POSTPARTUM INTERMEDIATE

## 2017-09-08 RX ADMIN — IBUPROFEN 800 MG: 400 TABLET ORAL at 02:17

## 2017-09-08 RX ADMIN — ACETAMINOPHEN 650 MG: 325 TABLET, FILM COATED ORAL at 02:17

## 2017-09-08 RX ADMIN — IBUPROFEN 800 MG: 400 TABLET ORAL at 14:00

## 2017-09-08 RX ADMIN — SENNOSIDES AND DOCUSATE SODIUM 1 TABLET: 8.6; 5 TABLET ORAL at 20:11

## 2017-09-08 RX ADMIN — IBUPROFEN 800 MG: 400 TABLET ORAL at 08:44

## 2017-09-08 RX ADMIN — ACETAMINOPHEN 650 MG: 325 TABLET, FILM COATED ORAL at 20:11

## 2017-09-08 RX ADMIN — ACETAMINOPHEN 650 MG: 325 TABLET, FILM COATED ORAL at 14:00

## 2017-09-08 RX ADMIN — SENNOSIDES AND DOCUSATE SODIUM 1 TABLET: 8.6; 5 TABLET ORAL at 08:44

## 2017-09-08 RX ADMIN — RANITIDINE 150 MG: 150 TABLET ORAL at 20:11

## 2017-09-08 RX ADMIN — ACETAMINOPHEN 650 MG: 325 TABLET, FILM COATED ORAL at 08:44

## 2017-09-08 RX ADMIN — IBUPROFEN 800 MG: 400 TABLET ORAL at 20:11

## 2017-09-08 RX ADMIN — RANITIDINE 150 MG: 150 TABLET ORAL at 08:44

## 2017-09-08 NOTE — PLAN OF CARE
Problem: Goal Outcome Summary  Goal: Goal Outcome Summary  Outcome: Improving  VSS, breastfeeding well with minimal assistance, pain controlled with Tylenol and Ibuprofen, states satisfaction with pain management, up independently with no complaints of dizziness,  at bedside, interacting and bonding well with infant, wanting to discharge this morning, encouraged to call with questions or concerns, will continue to monitor.

## 2017-09-08 NOTE — PLAN OF CARE
Problem: Goal Outcome Summary  Goal: Goal Outcome Summary  Outcome: No Change  Vitals stable. Feels well.  Wanted to go home today, disappointed, unhappy  and teary eyed when not able to due to baby's TSB.  Oral pain meds working well.  Breast feeding going well- despite baby's tongue tie.  Pumping started per Ped request due to photo therapy- small amounts of ebm obtained.  Started to watch DVD- will resume this evening when she feels more settled.  Will continue to monitor.

## 2017-09-08 NOTE — LACTATION NOTE
Routine visit. Pt states breastfeeding is going very well despite tongue tie. She states infant will probably have a frenulectomy outpatient. Pt started pumping per peds recommendation d/t infant's hyperbilirubinemia. She's then finger feeding drops of colostrum to infant. Pt denies questions. Will revisit as needed.

## 2017-09-09 VITALS
HEART RATE: 63 BPM | SYSTOLIC BLOOD PRESSURE: 113 MMHG | TEMPERATURE: 98.4 F | HEIGHT: 62 IN | OXYGEN SATURATION: 95 % | RESPIRATION RATE: 16 BRPM | BODY MASS INDEX: 34.78 KG/M2 | DIASTOLIC BLOOD PRESSURE: 75 MMHG | WEIGHT: 189 LBS

## 2017-09-09 PROCEDURE — 25000132 ZZH RX MED GY IP 250 OP 250 PS 637: Performed by: ADVANCED PRACTICE MIDWIFE

## 2017-09-09 RX ADMIN — SENNOSIDES AND DOCUSATE SODIUM 1 TABLET: 8.6; 5 TABLET ORAL at 08:20

## 2017-09-09 RX ADMIN — ACETAMINOPHEN 650 MG: 325 TABLET, FILM COATED ORAL at 08:20

## 2017-09-09 RX ADMIN — ACETAMINOPHEN 650 MG: 325 TABLET, FILM COATED ORAL at 01:54

## 2017-09-09 RX ADMIN — IBUPROFEN 800 MG: 400 TABLET ORAL at 08:20

## 2017-09-09 RX ADMIN — IBUPROFEN 800 MG: 400 TABLET ORAL at 01:54

## 2017-09-09 RX ADMIN — RANITIDINE 150 MG: 150 TABLET ORAL at 08:20

## 2017-09-09 NOTE — PLAN OF CARE
Problem: Goal Outcome Summary  Goal: Goal Outcome Summary  Outcome: Improving  VSS. Pain managed with Tylenol and Ibuprofen. FF@U with scant flow. Up independently ambulating and performing  cares. Wants to discharge today in AM. Will continue to monitor.

## 2017-09-09 NOTE — PLAN OF CARE
Problem: Goal Outcome Summary  Goal: Goal Outcome Summary  Outcome: Adequate for Discharge Date Met:  09/09/17  D: VSS, assessments WDL.   I: Pt. received complete discharge paperwork .  Pt. was given times of last dose for all discharge medications in writing on discharge medication sheets.  Discharge teaching included home medication, pain management, activity restrictions, postpartum cares, and signs and symptoms of infection.    A: Discharge outcomes on care plan met.  Mother states understanding and comfort with self and baby cares.  P: Pt. discharged to home.  Pt. was discharged with baby, and bands were checked at time of discharge.  Pt. was accompanied by , nurse and baby, and left with personal belongings.  Home care ordered as appropriate.  Pt. to follow up with OB per MD order.  Pt. had no further questions at the time of discharge and no unmet needs were identified.

## 2017-09-09 NOTE — DISCHARGE SUMMARY
[facility] Discharge Summary    Renetta Araujo MRN# [id number]   Age: 31 year old YOB: 1985     Date of Admission:  2017  Date of Discharge::  2017  Admitting Physician:  SAV Dai CNM  Discharge Physician:  SAV Newman CNM     Home clinic: [facility]          Admission Diagnoses:   Indication for care in labor or delivery   (normal spontaneous vaginal delivery)   (normal spontaneous vaginal delivery)          Discharge Diagnosis:   Normal spontaneous vaginal delivery  Intrauterine pregnancy at 40 weeks gestation          Procedures:   Procedure(s): Fetal scalp electrode placement  Intrauterine pressure catheter placement  Repair of second degree perineal laceration       No procedures performed during this admission           Medications Prior to Admission:   None          Discharge Medications:   No medications were prescribed on discharge          Consultations:   Consultation during this admission received from OB provider Dr. Magallanes          Brief History of Labor:   Prolonged first stage of labor with possible prolonged ROM           Hospital Course:   The patient's hospital course was unremarkable.  On discharge, her pain was well controlled. Vaginal bleeding is similar to peak menstrual flow.  Voiding without difficulty.  Ambulating well and tolerating a normal diet.  No fever.  Breastfeeding well.  Infant is stable.  No bowel movement yet. She was discharged on post-partum day #2.    Post-partum hemoglobin: No results found for: HGB          Discharge Instructions and Follow-Up:   Discharge diet: Regular   Discharge activity: Pelvic rest: abstain from intercourse and do not use tampons for 6 week(s)   Discharge follow-up: Follow up with me in 6-8 weeks   Wound care: Drink plenty of fluids  Ice to area for comfort  Keep wound clean and dry           Discharge Disposition:   Discharged to home       SAV Newman CNM

## 2017-09-09 NOTE — PLAN OF CARE
Problem: Goal Outcome Summary  Goal: Goal Outcome Summary  Outcome: No Change  Pt up walking independently. Encouraged to walk in halls but stayed in room. Eating, drinking, voiding adequately. Good pain control with ibuprofen and tylenol. Nursing going well. Pt and SO very frustrated they cannot discharge due to baby neding to be on bili lights. After the 1800 bili draw peds was called asked if they could discharge, per parents request, and MD stated baby needed to stay on lights and have a bili draw in the AM. Parents are frustrated they are not being given choices, just told what needs to be done. They stated they chose their pediatrician (the peds assigned to the baby here at the hospital is on call) so they would have choices, like letting the baby be in sunlight to treat the jaundice. They asked if baby needed to be under the bili lights during the night. It was reinforced that the more time baby spends under the lights the better. Parents stated they knew they could leave AMA but have not chosen to do so. Parents state they hope to discharge by 10am. They were informed that discharge is only as early as the discharge orders from the providers, but we would do our best to discharge them as early as possible.. Their CNM has put in DC orders for the pt this evening. Pt declines to pump this evening.